# Patient Record
Sex: FEMALE | Race: WHITE | NOT HISPANIC OR LATINO
[De-identification: names, ages, dates, MRNs, and addresses within clinical notes are randomized per-mention and may not be internally consistent; named-entity substitution may affect disease eponyms.]

---

## 2017-10-10 ENCOUNTER — RESULT REVIEW (OUTPATIENT)
Age: 22
End: 2017-10-10

## 2018-02-21 ENCOUNTER — APPOINTMENT (OUTPATIENT)
Dept: PEDIATRIC CARDIOLOGY | Facility: CLINIC | Age: 23
End: 2018-02-21
Payer: COMMERCIAL

## 2018-02-21 ENCOUNTER — OUTPATIENT (OUTPATIENT)
Dept: OUTPATIENT SERVICES | Age: 23
LOS: 1 days | Discharge: HOME | End: 2018-02-21

## 2018-02-21 PROBLEM — Z00.00 ENCOUNTER FOR PREVENTIVE HEALTH EXAMINATION: Status: ACTIVE | Noted: 2018-02-21

## 2018-02-21 PROCEDURE — 93325 DOPPLER ECHO COLOR FLOW MAPG: CPT | Mod: 59

## 2018-02-21 PROCEDURE — 76825 ECHO EXAM OF FETAL HEART: CPT

## 2018-02-21 PROCEDURE — 76820 UMBILICAL ARTERY ECHO: CPT

## 2018-02-21 PROCEDURE — 76821 MIDDLE CEREBRAL ARTERY ECHO: CPT

## 2018-02-21 PROCEDURE — 99202 OFFICE O/P NEW SF 15 MIN: CPT | Mod: 25

## 2018-02-21 PROCEDURE — 76827 ECHO EXAM OF FETAL HEART: CPT

## 2018-03-14 ENCOUNTER — APPOINTMENT (OUTPATIENT)
Dept: PEDIATRIC CARDIOLOGY | Facility: CLINIC | Age: 23
End: 2018-03-14
Payer: COMMERCIAL

## 2018-03-14 PROCEDURE — 76820 UMBILICAL ARTERY ECHO: CPT | Mod: 25

## 2018-03-14 PROCEDURE — 93325 DOPPLER ECHO COLOR FLOW MAPG: CPT | Mod: 59

## 2018-03-14 PROCEDURE — 99213 OFFICE O/P EST LOW 20 MIN: CPT | Mod: 25

## 2018-03-14 PROCEDURE — 76828 ECHO EXAM OF FETAL HEART: CPT

## 2018-03-14 PROCEDURE — 76821 MIDDLE CEREBRAL ARTERY ECHO: CPT

## 2018-03-14 PROCEDURE — 76826 ECHO EXAM OF FETAL HEART: CPT

## 2018-05-06 ENCOUNTER — OUTPATIENT (OUTPATIENT)
Dept: OUTPATIENT SERVICES | Facility: HOSPITAL | Age: 23
LOS: 1 days | End: 2018-05-06

## 2018-05-06 DIAGNOSIS — O26.899 OTHER SPECIFIED PREGNANCY RELATED CONDITIONS, UNSPECIFIED TRIMESTER: ICD-10-CM

## 2018-05-06 DIAGNOSIS — Z3A.00 WEEKS OF GESTATION OF PREGNANCY NOT SPECIFIED: ICD-10-CM

## 2018-05-17 ENCOUNTER — TRANSCRIPTION ENCOUNTER (OUTPATIENT)
Age: 23
End: 2018-05-17

## 2018-05-17 ENCOUNTER — INPATIENT (INPATIENT)
Facility: HOSPITAL | Age: 23
LOS: 3 days | Discharge: HOME | End: 2018-05-21
Attending: OBSTETRICS & GYNECOLOGY | Admitting: OBSTETRICS & GYNECOLOGY
Payer: COMMERCIAL

## 2018-05-17 VITALS — HEIGHT: 60 IN | WEIGHT: 169.76 LBS

## 2018-05-17 DIAGNOSIS — Z3A.00 WEEKS OF GESTATION OF PREGNANCY NOT SPECIFIED: ICD-10-CM

## 2018-05-17 DIAGNOSIS — O26.899 OTHER SPECIFIED PREGNANCY RELATED CONDITIONS, UNSPECIFIED TRIMESTER: ICD-10-CM

## 2018-05-17 LAB
ANTIBODY ID 1_1: SIGNIFICANT CHANGE UP
BASOPHILS # BLD AUTO: 0.02 K/UL — SIGNIFICANT CHANGE UP (ref 0–0.2)
BASOPHILS NFR BLD AUTO: 0.2 % — SIGNIFICANT CHANGE UP (ref 0–2)
BLD GP AB SCN SERPL QL: POSITIVE — SIGNIFICANT CHANGE UP
DAT POLY-SP REAG RBC QL: NEGATIVE — SIGNIFICANT CHANGE UP
EOSINOPHIL # BLD AUTO: 0.06 K/UL — SIGNIFICANT CHANGE UP (ref 0–0.5)
EOSINOPHIL NFR BLD AUTO: 0.5 % — SIGNIFICANT CHANGE UP (ref 0–6)
HCT VFR BLD CALC: 37.1 % — SIGNIFICANT CHANGE UP (ref 34.5–45)
HGB BLD-MCNC: 12.6 G/DL — SIGNIFICANT CHANGE UP (ref 11.5–15.5)
IMM GRANULOCYTES # BLD AUTO: 0.09 # — SIGNIFICANT CHANGE UP
IMM GRANULOCYTES NFR BLD AUTO: 0.8 % — SIGNIFICANT CHANGE UP (ref 0–1.5)
LYMPHOCYTES # BLD AUTO: 1.77 K/UL — SIGNIFICANT CHANGE UP (ref 1–3.3)
LYMPHOCYTES # BLD AUTO: 15.7 % — SIGNIFICANT CHANGE UP (ref 13–44)
MCHC RBC-ENTMCNC: 30.1 PG — SIGNIFICANT CHANGE UP (ref 27–34)
MCHC RBC-ENTMCNC: 34 % — SIGNIFICANT CHANGE UP (ref 32–36)
MCV RBC AUTO: 88.5 FL — SIGNIFICANT CHANGE UP (ref 80–100)
MONOCYTES # BLD AUTO: 0.66 K/UL — SIGNIFICANT CHANGE UP (ref 0–0.9)
MONOCYTES NFR BLD AUTO: 5.9 % — SIGNIFICANT CHANGE UP (ref 2–14)
NEUTROPHILS # BLD AUTO: 8.65 K/UL — HIGH (ref 1.8–7.4)
NEUTROPHILS NFR BLD AUTO: 76.9 % — SIGNIFICANT CHANGE UP (ref 43–77)
NRBC # FLD: 0 — SIGNIFICANT CHANGE UP
PLATELET # BLD AUTO: 173 K/UL — SIGNIFICANT CHANGE UP (ref 150–400)
PMV BLD: 10.2 FL — SIGNIFICANT CHANGE UP (ref 7–13)
RBC # BLD: 4.19 M/UL — SIGNIFICANT CHANGE UP (ref 3.8–5.2)
RBC # FLD: 13.8 % — SIGNIFICANT CHANGE UP (ref 10.3–14.5)
RH IG SCN BLD-IMP: NEGATIVE — SIGNIFICANT CHANGE UP
RH IG SCN BLD-IMP: NEGATIVE — SIGNIFICANT CHANGE UP
T PALLIDUM AB TITR SER: NEGATIVE — SIGNIFICANT CHANGE UP
WBC # BLD: 11.25 K/UL — HIGH (ref 3.8–10.5)
WBC # FLD AUTO: 11.25 K/UL — HIGH (ref 3.8–10.5)

## 2018-05-17 PROCEDURE — 86077 PHYS BLOOD BANK SERV XMATCH: CPT

## 2018-05-17 RX ORDER — OXYTOCIN 10 UNIT/ML
333.33 VIAL (ML) INJECTION
Qty: 20 | Refills: 0 | Status: COMPLETED | OUTPATIENT
Start: 2018-05-17

## 2018-05-17 RX ORDER — SODIUM CHLORIDE 9 MG/ML
1000 INJECTION, SOLUTION INTRAVENOUS ONCE
Qty: 0 | Refills: 0 | Status: COMPLETED | OUTPATIENT
Start: 2018-05-17 | End: 2018-05-17

## 2018-05-17 RX ORDER — SODIUM CHLORIDE 9 MG/ML
1000 INJECTION, SOLUTION INTRAVENOUS
Qty: 0 | Refills: 0 | Status: DISCONTINUED | OUTPATIENT
Start: 2018-05-17 | End: 2018-05-18

## 2018-05-17 RX ORDER — AMPICILLIN TRIHYDRATE 250 MG
CAPSULE ORAL
Qty: 0 | Refills: 0 | Status: DISCONTINUED | OUTPATIENT
Start: 2018-05-17 | End: 2018-05-18

## 2018-05-17 RX ORDER — AMPICILLIN TRIHYDRATE 250 MG
1 CAPSULE ORAL EVERY 4 HOURS
Qty: 0 | Refills: 0 | Status: DISCONTINUED | OUTPATIENT
Start: 2018-05-17 | End: 2018-05-18

## 2018-05-17 RX ORDER — AMPICILLIN TRIHYDRATE 250 MG
2 CAPSULE ORAL ONCE
Qty: 0 | Refills: 0 | Status: COMPLETED | OUTPATIENT
Start: 2018-05-17 | End: 2018-05-17

## 2018-05-17 RX ADMIN — SODIUM CHLORIDE 2000 MILLILITER(S): 9 INJECTION, SOLUTION INTRAVENOUS at 11:50

## 2018-05-17 RX ADMIN — SODIUM CHLORIDE 250 MILLILITER(S): 9 INJECTION, SOLUTION INTRAVENOUS at 19:11

## 2018-05-17 RX ADMIN — Medication 216 GRAM(S): at 12:00

## 2018-05-17 RX ADMIN — SODIUM CHLORIDE 250 MILLILITER(S): 9 INJECTION, SOLUTION INTRAVENOUS at 12:46

## 2018-05-17 RX ADMIN — Medication 108 GRAM(S): at 20:02

## 2018-05-17 RX ADMIN — Medication 108 GRAM(S): at 16:25

## 2018-05-18 ENCOUNTER — RESULT REVIEW (OUTPATIENT)
Age: 23
End: 2018-05-18

## 2018-05-18 PROCEDURE — 88307 TISSUE EXAM BY PATHOLOGIST: CPT | Mod: 26

## 2018-05-18 RX ORDER — KETOROLAC TROMETHAMINE 30 MG/ML
30 SYRINGE (ML) INJECTION EVERY 6 HOURS
Qty: 0 | Refills: 0 | Status: DISCONTINUED | OUTPATIENT
Start: 2018-05-18 | End: 2018-05-19

## 2018-05-18 RX ORDER — DOCUSATE SODIUM 100 MG
100 CAPSULE ORAL
Qty: 0 | Refills: 0 | Status: DISCONTINUED | OUTPATIENT
Start: 2018-05-18 | End: 2018-05-21

## 2018-05-18 RX ORDER — OXYTOCIN 10 UNIT/ML
2 VIAL (ML) INJECTION
Qty: 30 | Refills: 0 | Status: DISCONTINUED | OUTPATIENT
Start: 2018-05-18 | End: 2018-05-18

## 2018-05-18 RX ORDER — GLYCERIN ADULT
1 SUPPOSITORY, RECTAL RECTAL AT BEDTIME
Qty: 0 | Refills: 0 | Status: DISCONTINUED | OUTPATIENT
Start: 2018-05-18 | End: 2018-05-21

## 2018-05-18 RX ORDER — CITRIC ACID/SODIUM CITRATE 300-500 MG
30 SOLUTION, ORAL ORAL ONCE
Qty: 0 | Refills: 0 | Status: COMPLETED | OUTPATIENT
Start: 2018-05-18 | End: 2018-05-18

## 2018-05-18 RX ORDER — FERROUS SULFATE 325(65) MG
325 TABLET ORAL DAILY
Qty: 0 | Refills: 0 | Status: DISCONTINUED | OUTPATIENT
Start: 2018-05-18 | End: 2018-05-21

## 2018-05-18 RX ORDER — DIPHENHYDRAMINE HCL 50 MG
25 CAPSULE ORAL EVERY 4 HOURS
Qty: 0 | Refills: 0 | Status: DISCONTINUED | OUTPATIENT
Start: 2018-05-19 | End: 2018-05-19

## 2018-05-18 RX ORDER — OXYTOCIN 10 UNIT/ML
41.67 VIAL (ML) INJECTION
Qty: 20 | Refills: 0 | Status: DISCONTINUED | OUTPATIENT
Start: 2018-05-18 | End: 2018-05-18

## 2018-05-18 RX ORDER — NALOXONE HYDROCHLORIDE 4 MG/.1ML
0.1 SPRAY NASAL
Qty: 0 | Refills: 0 | Status: DISCONTINUED | OUTPATIENT
Start: 2018-05-19 | End: 2018-05-19

## 2018-05-18 RX ORDER — SODIUM CHLORIDE 9 MG/ML
1000 INJECTION, SOLUTION INTRAVENOUS
Qty: 0 | Refills: 0 | Status: DISCONTINUED | OUTPATIENT
Start: 2018-05-18 | End: 2018-05-19

## 2018-05-18 RX ORDER — HEPARIN SODIUM 5000 [USP'U]/ML
5000 INJECTION INTRAVENOUS; SUBCUTANEOUS EVERY 12 HOURS
Qty: 0 | Refills: 0 | Status: DISCONTINUED | OUTPATIENT
Start: 2018-05-18 | End: 2018-05-21

## 2018-05-18 RX ORDER — OXYCODONE HYDROCHLORIDE 5 MG/1
5 TABLET ORAL
Qty: 0 | Refills: 0 | Status: DISCONTINUED | OUTPATIENT
Start: 2018-05-18 | End: 2018-05-19

## 2018-05-18 RX ORDER — SODIUM CHLORIDE 9 MG/ML
1000 INJECTION, SOLUTION INTRAVENOUS
Qty: 0 | Refills: 0 | Status: DISCONTINUED | OUTPATIENT
Start: 2018-05-18 | End: 2018-05-18

## 2018-05-18 RX ORDER — LANOLIN
1 OINTMENT (GRAM) TOPICAL
Qty: 0 | Refills: 0 | Status: DISCONTINUED | OUTPATIENT
Start: 2018-05-18 | End: 2018-05-21

## 2018-05-18 RX ORDER — HYDROMORPHONE HYDROCHLORIDE 2 MG/ML
1 INJECTION INTRAMUSCULAR; INTRAVENOUS; SUBCUTANEOUS
Qty: 0 | Refills: 0 | Status: DISCONTINUED | OUTPATIENT
Start: 2018-05-18 | End: 2018-05-19

## 2018-05-18 RX ORDER — OXYCODONE HYDROCHLORIDE 5 MG/1
5 TABLET ORAL
Qty: 0 | Refills: 0 | Status: COMPLETED | OUTPATIENT
Start: 2018-05-18 | End: 2018-05-25

## 2018-05-18 RX ORDER — METOCLOPRAMIDE HCL 10 MG
10 TABLET ORAL ONCE
Qty: 0 | Refills: 0 | Status: COMPLETED | OUTPATIENT
Start: 2018-05-18 | End: 2018-05-18

## 2018-05-18 RX ORDER — ONDANSETRON 8 MG/1
4 TABLET, FILM COATED ORAL EVERY 6 HOURS
Qty: 0 | Refills: 0 | Status: DISCONTINUED | OUTPATIENT
Start: 2018-05-18 | End: 2018-05-19

## 2018-05-18 RX ORDER — OXYCODONE HYDROCHLORIDE 5 MG/1
5 TABLET ORAL EVERY 4 HOURS
Qty: 0 | Refills: 0 | Status: COMPLETED | OUTPATIENT
Start: 2018-05-18 | End: 2018-05-25

## 2018-05-18 RX ORDER — TETANUS TOXOID, REDUCED DIPHTHERIA TOXOID AND ACELLULAR PERTUSSIS VACCINE, ADSORBED 5; 2.5; 8; 8; 2.5 [IU]/.5ML; [IU]/.5ML; UG/.5ML; UG/.5ML; UG/.5ML
0.5 SUSPENSION INTRAMUSCULAR ONCE
Qty: 0 | Refills: 0 | Status: DISCONTINUED | OUTPATIENT
Start: 2018-05-18 | End: 2018-05-21

## 2018-05-18 RX ORDER — SIMETHICONE 80 MG/1
80 TABLET, CHEWABLE ORAL EVERY 4 HOURS
Qty: 0 | Refills: 0 | Status: DISCONTINUED | OUTPATIENT
Start: 2018-05-18 | End: 2018-05-21

## 2018-05-18 RX ORDER — OXYTOCIN 10 UNIT/ML
333.33 VIAL (ML) INJECTION
Qty: 20 | Refills: 0 | Status: DISCONTINUED | OUTPATIENT
Start: 2018-05-18 | End: 2018-05-18

## 2018-05-18 RX ORDER — DIPHENHYDRAMINE HCL 50 MG
25 CAPSULE ORAL EVERY 6 HOURS
Qty: 0 | Refills: 0 | Status: DISCONTINUED | OUTPATIENT
Start: 2018-05-18 | End: 2018-05-21

## 2018-05-18 RX ORDER — FAMOTIDINE 10 MG/ML
20 INJECTION INTRAVENOUS ONCE
Qty: 0 | Refills: 0 | Status: COMPLETED | OUTPATIENT
Start: 2018-05-18 | End: 2018-05-18

## 2018-05-18 RX ORDER — OXYCODONE HYDROCHLORIDE 5 MG/1
10 TABLET ORAL
Qty: 0 | Refills: 0 | Status: DISCONTINUED | OUTPATIENT
Start: 2018-05-19 | End: 2018-05-19

## 2018-05-18 RX ORDER — IBUPROFEN 200 MG
600 TABLET ORAL EVERY 6 HOURS
Qty: 0 | Refills: 0 | Status: COMPLETED | OUTPATIENT
Start: 2018-05-18 | End: 2019-04-16

## 2018-05-18 RX ORDER — ACETAMINOPHEN 500 MG
975 TABLET ORAL EVERY 6 HOURS
Qty: 0 | Refills: 0 | Status: DISCONTINUED | OUTPATIENT
Start: 2018-05-18 | End: 2018-05-21

## 2018-05-18 RX ADMIN — Medication 125 MILLIUNIT(S)/MIN: at 12:25

## 2018-05-18 RX ADMIN — Medication 125 MILLIUNIT(S)/MIN: at 16:21

## 2018-05-18 RX ADMIN — Medication 975 MILLIGRAM(S): at 18:20

## 2018-05-18 RX ADMIN — Medication 30 MILLIGRAM(S): at 18:21

## 2018-05-18 RX ADMIN — Medication 975 MILLIGRAM(S): at 18:36

## 2018-05-18 RX ADMIN — Medication 30 MILLIGRAM(S): at 18:35

## 2018-05-18 RX ADMIN — Medication 108 GRAM(S): at 04:00

## 2018-05-18 RX ADMIN — FAMOTIDINE 20 MILLIGRAM(S): 10 INJECTION INTRAVENOUS at 10:10

## 2018-05-18 RX ADMIN — Medication 10 MILLIGRAM(S): at 10:10

## 2018-05-18 RX ADMIN — Medication 108 GRAM(S): at 00:00

## 2018-05-18 RX ADMIN — Medication 2 MILLIUNIT(S)/MIN: at 00:17

## 2018-05-18 RX ADMIN — Medication 30 MILLILITER(S): at 10:11

## 2018-05-18 RX ADMIN — SODIUM CHLORIDE 125 MILLILITER(S): 9 INJECTION, SOLUTION INTRAVENOUS at 19:02

## 2018-05-18 RX ADMIN — Medication 108 GRAM(S): at 07:58

## 2018-05-18 RX ADMIN — HEPARIN SODIUM 5000 UNIT(S): 5000 INJECTION INTRAVENOUS; SUBCUTANEOUS at 18:20

## 2018-05-19 LAB
BASOPHILS # BLD AUTO: 0.03 K/UL — SIGNIFICANT CHANGE UP (ref 0–0.2)
BASOPHILS NFR BLD AUTO: 0.2 % — SIGNIFICANT CHANGE UP (ref 0–2)
EOSINOPHIL # BLD AUTO: 0.23 K/UL — SIGNIFICANT CHANGE UP (ref 0–0.5)
EOSINOPHIL NFR BLD AUTO: 1.7 % — SIGNIFICANT CHANGE UP (ref 0–6)
HCT VFR BLD CALC: 26.7 % — LOW (ref 34.5–45)
HGB BLD-MCNC: 8.8 G/DL — LOW (ref 11.5–15.5)
IMM GRANULOCYTES # BLD AUTO: 0.11 # — SIGNIFICANT CHANGE UP
IMM GRANULOCYTES NFR BLD AUTO: 0.8 % — SIGNIFICANT CHANGE UP (ref 0–1.5)
LYMPHOCYTES # BLD AUTO: 17.7 % — SIGNIFICANT CHANGE UP (ref 13–44)
LYMPHOCYTES # BLD AUTO: 2.36 K/UL — SIGNIFICANT CHANGE UP (ref 1–3.3)
MCHC RBC-ENTMCNC: 29.5 PG — SIGNIFICANT CHANGE UP (ref 27–34)
MCHC RBC-ENTMCNC: 33 % — SIGNIFICANT CHANGE UP (ref 32–36)
MCV RBC AUTO: 89.6 FL — SIGNIFICANT CHANGE UP (ref 80–100)
MONOCYTES # BLD AUTO: 1.02 K/UL — HIGH (ref 0–0.9)
MONOCYTES NFR BLD AUTO: 7.7 % — SIGNIFICANT CHANGE UP (ref 2–14)
NEUTROPHILS # BLD AUTO: 9.55 K/UL — HIGH (ref 1.8–7.4)
NEUTROPHILS NFR BLD AUTO: 71.9 % — SIGNIFICANT CHANGE UP (ref 43–77)
NRBC # FLD: 0 — SIGNIFICANT CHANGE UP
PLATELET # BLD AUTO: 160 K/UL — SIGNIFICANT CHANGE UP (ref 150–400)
PMV BLD: 10.5 FL — SIGNIFICANT CHANGE UP (ref 7–13)
RBC # BLD FETUS AUTO: 0 — SIGNIFICANT CHANGE UP
RBC # BLD: 2.98 M/UL — LOW (ref 3.8–5.2)
RBC # FLD: 14.6 % — HIGH (ref 10.3–14.5)
WBC # BLD: 13.3 K/UL — HIGH (ref 3.8–10.5)
WBC # FLD AUTO: 13.3 K/UL — HIGH (ref 3.8–10.5)

## 2018-05-19 RX ORDER — IBUPROFEN 200 MG
600 TABLET ORAL EVERY 6 HOURS
Qty: 0 | Refills: 0 | Status: DISCONTINUED | OUTPATIENT
Start: 2018-05-19 | End: 2018-05-21

## 2018-05-19 RX ORDER — OXYCODONE HYDROCHLORIDE 5 MG/1
5 TABLET ORAL
Qty: 0 | Refills: 0 | Status: DISCONTINUED | OUTPATIENT
Start: 2018-05-19 | End: 2018-05-21

## 2018-05-19 RX ORDER — OXYCODONE HYDROCHLORIDE 5 MG/1
5 TABLET ORAL EVERY 4 HOURS
Qty: 0 | Refills: 0 | Status: DISCONTINUED | OUTPATIENT
Start: 2018-05-19 | End: 2018-05-21

## 2018-05-19 RX ADMIN — SODIUM CHLORIDE 125 MILLILITER(S): 9 INJECTION, SOLUTION INTRAVENOUS at 11:15

## 2018-05-19 RX ADMIN — Medication 30 MILLIGRAM(S): at 06:22

## 2018-05-19 RX ADMIN — Medication 975 MILLIGRAM(S): at 18:21

## 2018-05-19 RX ADMIN — Medication 975 MILLIGRAM(S): at 12:30

## 2018-05-19 RX ADMIN — Medication 600 MILLIGRAM(S): at 17:32

## 2018-05-19 RX ADMIN — Medication 975 MILLIGRAM(S): at 11:47

## 2018-05-19 RX ADMIN — HEPARIN SODIUM 5000 UNIT(S): 5000 INJECTION INTRAVENOUS; SUBCUTANEOUS at 05:22

## 2018-05-19 RX ADMIN — Medication 325 MILLIGRAM(S): at 11:47

## 2018-05-19 RX ADMIN — Medication 600 MILLIGRAM(S): at 11:47

## 2018-05-19 RX ADMIN — Medication 30 MILLIGRAM(S): at 01:00

## 2018-05-19 RX ADMIN — Medication 1 TABLET(S): at 11:47

## 2018-05-19 RX ADMIN — Medication 30 MILLIGRAM(S): at 00:18

## 2018-05-19 RX ADMIN — Medication 30 MILLIGRAM(S): at 05:22

## 2018-05-19 RX ADMIN — Medication 100 MILLIGRAM(S): at 11:47

## 2018-05-19 RX ADMIN — HEPARIN SODIUM 5000 UNIT(S): 5000 INJECTION INTRAVENOUS; SUBCUTANEOUS at 17:32

## 2018-05-19 RX ADMIN — Medication 975 MILLIGRAM(S): at 17:33

## 2018-05-19 RX ADMIN — Medication 600 MILLIGRAM(S): at 18:21

## 2018-05-19 RX ADMIN — Medication 600 MILLIGRAM(S): at 12:30

## 2018-05-19 NOTE — PROGRESS NOTE ADULT - PROBLEM SELECTOR PLAN 1
- increase out of bed and ambulation  - analgesia prn  - continue regular diet  - incentive spirometry    RAJIV Ag, PGY1

## 2018-05-19 NOTE — PROGRESS NOTE ADULT - SUBJECTIVE AND OBJECTIVE BOX
Postpartum Note,  Section  She is a  23y woman who is now post-operative day: 1    Subjective:  The patient feels well, no acute complaints.  Ambulating, tolerating PO diet, denies nausea/vomiting.  Voiding spontaneously.  Pain is controlled. Reports normal postpartum bleeding.    Physical exam:    Vital Signs Last 24 Hrs  T(C): 36.9 (19 May 2018 05:40), Max: 37.1 (19 May 2018 01:35)  T(F): 98.4 (19 May 2018 05:40), Max: 98.8 (19 May 2018 01:35)  HR: 95 (19 May 2018 05:40) (73 - 99)  BP: 103/62 (19 May 2018 05:40) (97/60 - 122/58)  BP(mean): 70 (18 May 2018 14:00) (69 - 88)  RR: 16 (19 May 2018 05:40) (16 - 21)  SpO2: 97% (19 May 2018 05:40) (97% - 100%)    Gen: NAD  Abdomen: Soft, nontender, no distension , firm uterine fundus at umbilicus.  Incision: Clean, dry, and intact with steri strips  Pelvic: Normal lochia noted  Ext: No calf tenderness    LABS:                        8.8    13.30 )-----------( 160      ( 19 May 2018 06:00 )             26.7                         12.6   11.25 )-----------( 173      ( 17 May 2018 11:50 )             37.1         Allergies    No Known Allergies    Intolerances      MEDICATIONS  (STANDING):  acetaminophen   Tablet. 975 milliGRAM(s) Oral every 6 hours  diphtheria/tetanus/pertussis (acellular) Vaccine (ADAcel) 0.5 milliLiter(s) IntraMuscular once  ferrous    sulfate 325 milliGRAM(s) Oral daily  heparin  Injectable 5000 Unit(s) SubCutaneous every 12 hours  ibuprofen  Tablet 600 milliGRAM(s) Oral every 6 hours  ketorolac   Injectable 30 milliGRAM(s) IV Push every 6 hours  lactated ringers. 1000 milliLiter(s) (125 mL/Hr) IV Continuous <Continuous>  oxyCODONE    IR 5 milliGRAM(s) Oral every 3 hours  prenatal multivitamin 1 Tablet(s) Oral daily    MEDICATIONS  (PRN):  diphenhydrAMINE   Capsule 25 milliGRAM(s) Oral every 6 hours PRN Itching  diphenhydrAMINE   Injectable 25 milliGRAM(s) IV Push every 4 hours PRN Pruritus  docusate sodium 100 milliGRAM(s) Oral two times a day PRN Stool Softening  glycerin Suppository - Adult 1 Suppository(s) Rectal at bedtime PRN Constipation  HYDROmorphone  Injectable 1 milliGRAM(s) IV Push every 3 hours PRN Severe Pain  lanolin Ointment 1 Application(s) Topical every 3 hours PRN Sore Nipples  naloxone Injectable 0.1 milliGRAM(s) IV Push every 3 minutes PRN For ANY of the following changes in patient status:  A. RR LESS THAN 10 breaths per minute, B. Oxygen saturation LESS THAN 90%, C. Sedation score of 6  ondansetron Injectable 4 milliGRAM(s) IV Push every 6 hours PRN Nausea  oxyCODONE    IR 5 milliGRAM(s) Oral every 4 hours PRN Severe Pain (7 - 10)  oxyCODONE    IR 5 milliGRAM(s) Oral every 3 hours PRN Mild Pain  oxyCODONE    IR 10 milliGRAM(s) Oral every 3 hours PRN Moderate Pain  simethicone 80 milliGRAM(s) Chew every 4 hours PRN Gas

## 2018-05-19 NOTE — PROGRESS NOTE PEDS - SUBJECTIVE AND OBJECTIVE BOX
Postop Day  __1_ s/p   C- Section    THERAPY:  [  ] Spinal morphine   [x  ] Epidural morphine   [  ] IV PCA Hydromorphone 1 mg/ml      Sedation Score:	  [ x ] Alert	    [  ] Drowsy        [  ] Arousable	[  ] Asleep	[  ] Unresponsive    Side Effects:	  [x  ] None	     [  ] Nausea        [  ] Pruritus        [  ] Weakness   [  ] Numbness        ASSESSMENT/ PLAN   [   ] Discontinue         [  ] Continue  [ x ]Documentation and Verification of current medications     Comments:
ANESTHESIA POSTOP CHECK    23y Female POSTOP DAY 1 S/P     Vital Signs Last 24 Hrs  T(C): 36.9 (19 May 2018 05:40), Max: 37.1 (19 May 2018 01:35)  T(F): 98.4 (19 May 2018 05:40), Max: 98.8 (19 May 2018 01:35)  HR: 95 (19 May 2018 05:40) (73 - 99)  BP: 103/62 (19 May 2018 05:40) (97/60 - 122/58)  BP(mean): 70 (18 May 2018 14:00) (69 - 88)  RR: 16 (19 May 2018 05:40) (16 - 21)  SpO2: 97% (19 May 2018 05:40) (97% - 100%)  I&O's Summary    18 May 2018 07:01  -  19 May 2018 07:00  --------------------------------------------------------  IN: 4798 mL / OUT: 2845 mL / NET: 1953 mL        [x ] NO APPARENT ANESTHESIA COMPLICATIONS      Comments:

## 2018-05-20 LAB
HCT VFR BLD CALC: 26.1 % — LOW (ref 34.5–45)
HGB BLD-MCNC: 8.7 G/DL — LOW (ref 11.5–15.5)
MCHC RBC-ENTMCNC: 29.9 PG — SIGNIFICANT CHANGE UP (ref 27–34)
MCHC RBC-ENTMCNC: 33.3 % — SIGNIFICANT CHANGE UP (ref 32–36)
MCV RBC AUTO: 89.7 FL — SIGNIFICANT CHANGE UP (ref 80–100)
NRBC # FLD: 0 — SIGNIFICANT CHANGE UP
PLATELET # BLD AUTO: 184 K/UL — SIGNIFICANT CHANGE UP (ref 150–400)
PMV BLD: 10.1 FL — SIGNIFICANT CHANGE UP (ref 7–13)
RBC # BLD: 2.91 M/UL — LOW (ref 3.8–5.2)
RBC # FLD: 14.6 % — HIGH (ref 10.3–14.5)
WBC # BLD: 12.6 K/UL — HIGH (ref 3.8–10.5)
WBC # FLD AUTO: 12.6 K/UL — HIGH (ref 3.8–10.5)

## 2018-05-20 RX ADMIN — Medication 975 MILLIGRAM(S): at 06:10

## 2018-05-20 RX ADMIN — Medication 600 MILLIGRAM(S): at 17:19

## 2018-05-20 RX ADMIN — HEPARIN SODIUM 5000 UNIT(S): 5000 INJECTION INTRAVENOUS; SUBCUTANEOUS at 17:19

## 2018-05-20 RX ADMIN — Medication 600 MILLIGRAM(S): at 11:30

## 2018-05-20 RX ADMIN — Medication 975 MILLIGRAM(S): at 05:42

## 2018-05-20 RX ADMIN — Medication 600 MILLIGRAM(S): at 12:30

## 2018-05-20 RX ADMIN — Medication 1 APPLICATION(S): at 05:42

## 2018-05-20 RX ADMIN — SIMETHICONE 80 MILLIGRAM(S): 80 TABLET, CHEWABLE ORAL at 05:42

## 2018-05-20 RX ADMIN — Medication 975 MILLIGRAM(S): at 17:18

## 2018-05-20 RX ADMIN — Medication 600 MILLIGRAM(S): at 06:10

## 2018-05-20 RX ADMIN — Medication 100 MILLIGRAM(S): at 05:42

## 2018-05-20 RX ADMIN — HEPARIN SODIUM 5000 UNIT(S): 5000 INJECTION INTRAVENOUS; SUBCUTANEOUS at 05:42

## 2018-05-20 RX ADMIN — Medication 600 MILLIGRAM(S): at 18:01

## 2018-05-20 RX ADMIN — Medication 975 MILLIGRAM(S): at 11:31

## 2018-05-20 RX ADMIN — Medication 325 MILLIGRAM(S): at 11:30

## 2018-05-20 RX ADMIN — Medication 600 MILLIGRAM(S): at 05:42

## 2018-05-20 RX ADMIN — Medication 975 MILLIGRAM(S): at 12:30

## 2018-05-20 RX ADMIN — Medication 975 MILLIGRAM(S): at 18:01

## 2018-05-20 RX ADMIN — Medication 1 TABLET(S): at 11:30

## 2018-05-20 NOTE — PROGRESS NOTE ADULT - PROBLEM SELECTOR PLAN 1
- Continue with po analgesia  - Increase ambulation  - Continue regular diet  - IV lock  - No labs    NICO Chapa, PGY-1

## 2018-05-20 NOTE — PROGRESS NOTE ADULT - SUBJECTIVE AND OBJECTIVE BOX
Patient seen and examined at bedside, no acute overnight events. No acute complaints, pain well controlled. Denies any HA, blurry vision, lightheadedness, CP/SOB, N/V. Patient is ambulating, voiding spontaneously, passing flatus, and tolerating regular diet. Pt is breast feeding her baby.    Vital Signs Last 24 Hours  T(C): 36.8 (05-20-18 @ 05:43), Max: 36.9 (05-19-18 @ 10:39)  HR: 79 (05-20-18 @ 05:43) (67 - 99)  BP: 113/76 (05-20-18 @ 05:43) (99/59 - 113/76)  RR: 16 (05-20-18 @ 05:43) (16 - 18)  SpO2: 99% (05-20-18 @ 05:43) (98% - 100%)    Physical Exam:  General: NAD  CV: RRR  Pulm: CTAB  Abdomen: Soft, non-tender, non-distended  Incision: Pfannenstiel incision CDI, subcuticular suture closure  Pelvic: Lochia wnl; fundus firm and non-tender   Extremities: no calf tenderness noted on exam    Labs:    Blood Type: A Negative  Antibody Screen: --  RPR: Negative               8.7    12.60 )-----------( 184      ( 05-20 @ 05:57 )             26.1                8.8    13.30 )-----------( 160      ( 05-19 @ 06:00 )             26.7                12.6   11.25 )-----------( 173      ( 05-17 @ 11:50 )             37.1         MEDICATIONS  (STANDING):  acetaminophen   Tablet. 975 milliGRAM(s) Oral every 6 hours  diphtheria/tetanus/pertussis (acellular) Vaccine (ADAcel) 0.5 milliLiter(s) IntraMuscular once  ferrous    sulfate 325 milliGRAM(s) Oral daily  heparin  Injectable 5000 Unit(s) SubCutaneous every 12 hours  ibuprofen  Tablet 600 milliGRAM(s) Oral every 6 hours  oxyCODONE    IR 5 milliGRAM(s) Oral every 3 hours  prenatal multivitamin 1 Tablet(s) Oral daily    MEDICATIONS  (PRN):  diphenhydrAMINE   Capsule 25 milliGRAM(s) Oral every 6 hours PRN Itching  docusate sodium 100 milliGRAM(s) Oral two times a day PRN Stool Softening  glycerin Suppository - Adult 1 Suppository(s) Rectal at bedtime PRN Constipation  lanolin Ointment 1 Application(s) Topical every 3 hours PRN Sore Nipples  oxyCODONE    IR 5 milliGRAM(s) Oral every 4 hours PRN Severe Pain (7 - 10)  simethicone 80 milliGRAM(s) Chew every 4 hours PRN Gas

## 2018-05-20 NOTE — PROGRESS NOTE ADULT - ATTENDING COMMENTS
Attending addendum:  Patient seen and examined, I agree with the above assessment and plan.   Luz Maria FRANKLIN

## 2018-05-21 ENCOUNTER — TRANSCRIPTION ENCOUNTER (OUTPATIENT)
Age: 23
End: 2018-05-21

## 2018-05-21 VITALS
DIASTOLIC BLOOD PRESSURE: 67 MMHG | HEART RATE: 66 BPM | TEMPERATURE: 98 F | RESPIRATION RATE: 16 BRPM | OXYGEN SATURATION: 100 % | SYSTOLIC BLOOD PRESSURE: 107 MMHG

## 2018-05-21 RX ORDER — ACETAMINOPHEN 500 MG
3 TABLET ORAL
Qty: 0 | Refills: 0 | COMMUNITY
Start: 2018-05-21

## 2018-05-21 RX ORDER — IBUPROFEN 200 MG
1 TABLET ORAL
Qty: 0 | Refills: 0 | COMMUNITY
Start: 2018-05-21

## 2018-05-21 RX ADMIN — Medication 600 MILLIGRAM(S): at 05:40

## 2018-05-21 RX ADMIN — Medication 975 MILLIGRAM(S): at 12:32

## 2018-05-21 RX ADMIN — Medication 975 MILLIGRAM(S): at 05:40

## 2018-05-21 RX ADMIN — SIMETHICONE 80 MILLIGRAM(S): 80 TABLET, CHEWABLE ORAL at 05:11

## 2018-05-21 RX ADMIN — Medication 975 MILLIGRAM(S): at 05:11

## 2018-05-21 RX ADMIN — Medication 325 MILLIGRAM(S): at 12:32

## 2018-05-21 RX ADMIN — HEPARIN SODIUM 5000 UNIT(S): 5000 INJECTION INTRAVENOUS; SUBCUTANEOUS at 05:11

## 2018-05-21 RX ADMIN — Medication 100 MILLIGRAM(S): at 05:11

## 2018-05-21 RX ADMIN — Medication 1 APPLICATION(S): at 05:11

## 2018-05-21 RX ADMIN — Medication 600 MILLIGRAM(S): at 12:32

## 2018-05-21 RX ADMIN — Medication 1 TABLET(S): at 12:32

## 2018-05-21 RX ADMIN — Medication 975 MILLIGRAM(S): at 13:32

## 2018-05-21 RX ADMIN — Medication 600 MILLIGRAM(S): at 13:30

## 2018-05-21 RX ADMIN — Medication 600 MILLIGRAM(S): at 05:11

## 2018-05-21 NOTE — DISCHARGE NOTE OB - CARE PROVIDER_API CALL
Karli Barron), Obstetrics and Gynecology  [address]  [address]  [place] [address]  Phone: [phone]  Fax: [phone]

## 2018-05-21 NOTE — PROGRESS NOTE ADULT - PROBLEM SELECTOR PLAN 1
- Continue motrin, tylenol, oxycodone PRN for pain control.  - Increase ambulation  - Continue regular diet  - Discharge planning    Laura Gill MD, PGY1

## 2018-05-21 NOTE — DISCHARGE NOTE OB - MEDICATION SUMMARY - MEDICATIONS TO TAKE
I will START or STAY ON the medications listed below when I get home from the hospital:    ibuprofen 600 mg oral tablet  -- 1 tab(s) by mouth every 6 hours, As Needed  -- Indication: For  delivery delivered    acetaminophen 325 mg oral tablet  -- 3 tab(s) by mouth every 6 hours, As Needed  -- Indication: For  delivery delivered

## 2018-05-21 NOTE — DISCHARGE NOTE OB - HOSPITAL COURSE
Pt admitted in labor, later underwent primary low transverse uterine  section for arrest of labor. Procedure uneventful.  Post op course stable. Discharged on POD #3 in stable condition.

## 2018-05-21 NOTE — DISCHARGE NOTE OB - PATIENT PORTAL LINK FT
You can access the [facility] Patient Portal, offered by [facility], by registering with the following website: [email or web address]

## 2018-05-21 NOTE — PROGRESS NOTE ADULT - SUBJECTIVE AND OBJECTIVE BOX
OB Postpartum Note:  Delivery, POD#3    S: 22yo POD#3 s/p LTCS. The patient feels well.  Pain is well controlled. She is tolerating a regular diet and passing flatus. She is voiding spontaneously, and ambulating without difficulty. Denies CP/SOB. Denies lightheadedness/dizziness. Denies N/V.    O:  Vitals:  Vital Signs Last 24 Hrs  T(C): 36.6 (21 May 2018 06:00), Max: 36.8 (20 May 2018 14:15)  T(F): 97.9 (21 May 2018 06:00), Max: 98.3 (20 May 2018 21:54)  HR: 66 (21 May 2018 06:00) (66 - 73)  BP: 107/67 (21 May 2018 06:00) (107/67 - 116/73)  RR: 16 (21 May 2018 06:00) (16 - 16)  SpO2: 100% (21 May 2018 06:00) (99% - 100%)    MEDICATIONS  (STANDING):  acetaminophen   Tablet. 975 milliGRAM(s) Oral every 6 hours  diphtheria/tetanus/pertussis (acellular) Vaccine (ADAcel) 0.5 milliLiter(s) IntraMuscular once  ferrous    sulfate 325 milliGRAM(s) Oral daily  heparin  Injectable 5000 Unit(s) SubCutaneous every 12 hours  ibuprofen  Tablet 600 milliGRAM(s) Oral every 6 hours  oxyCODONE    IR 5 milliGRAM(s) Oral every 3 hours  prenatal multivitamin 1 Tablet(s) Oral daily    MEDICATIONS  (PRN):  diphenhydrAMINE   Capsule 25 milliGRAM(s) Oral every 6 hours PRN Itching  docusate sodium 100 milliGRAM(s) Oral two times a day PRN Stool Softening  glycerin Suppository - Adult 1 Suppository(s) Rectal at bedtime PRN Constipation  lanolin Ointment 1 Application(s) Topical every 3 hours PRN Sore Nipples  oxyCODONE    IR 5 milliGRAM(s) Oral every 4 hours PRN Severe Pain (7 - 10)  simethicone 80 milliGRAM(s) Chew every 4 hours PRN Gas      LABS:  Blood type: A Negative  Rubella IgG: RPR: Negative                          8.7<L>   12.60<H> >-----------< 184    ( 0520 @ 05:57 )             26.1<L>                        8.8<L>   13.30<H> >-----------< 160    (  @ 06:00 )             26.7<L>      Physical exam:  Gen: NAD  Abdomen: Soft, nontender, no distension , firm uterine fundus at umbilicus.  Incision: Clean, dry, and intact   Pelvic: Normal lochia noted  Ext: No calf tenderness

## 2018-05-21 NOTE — DISCHARGE NOTE OB - CARE PLAN
Principal Discharge DX:	 delivery delivered  Goal:	full recovery  Assessment and plan of treatment:	nothing in vagina, no heavy lifting and no strenuous activity for the next 4 weeks.

## 2018-05-21 NOTE — DISCHARGE NOTE OB - MATERIALS PROVIDED
Birth Certificate Instructions/[place]  Immunization Record/Breastfeeding Log/Shaken Baby Prevention Handout/Guide to Postpartum Care/Bottle Feeding Log

## 2018-06-27 NOTE — PROGRESS NOTE ADULT - ASSESSMENT
A/P: 22yo POD#3 s/p LTCS.  Patient is stable and is doing well post-operatively. Fall with Harm Risk

## 2020-07-21 ENCOUNTER — RESULT REVIEW (OUTPATIENT)
Age: 25
End: 2020-07-21